# Patient Record
Sex: FEMALE | Race: BLACK OR AFRICAN AMERICAN | NOT HISPANIC OR LATINO | Employment: OTHER | URBAN - METROPOLITAN AREA
[De-identification: names, ages, dates, MRNs, and addresses within clinical notes are randomized per-mention and may not be internally consistent; named-entity substitution may affect disease eponyms.]

---

## 2017-05-22 ENCOUNTER — TELEPHONE (OUTPATIENT)
Dept: TRANSPLANT | Facility: CLINIC | Age: 62
End: 2017-05-22

## 2017-05-29 DIAGNOSIS — Z76.82 ORGAN TRANSPLANT CANDIDATE: Primary | ICD-10-CM

## 2017-06-13 ENCOUNTER — TELEPHONE (OUTPATIENT)
Dept: TRANSPLANT | Facility: CLINIC | Age: 62
End: 2017-06-13

## 2017-06-13 ENCOUNTER — OFFICE VISIT (OUTPATIENT)
Dept: TRANSPLANT | Facility: CLINIC | Age: 62
End: 2017-06-13
Payer: MEDICARE

## 2017-06-13 VITALS
HEART RATE: 89 BPM | RESPIRATION RATE: 20 BRPM | SYSTOLIC BLOOD PRESSURE: 138 MMHG | TEMPERATURE: 98 F | WEIGHT: 229.94 LBS | DIASTOLIC BLOOD PRESSURE: 77 MMHG | HEIGHT: 64 IN | OXYGEN SATURATION: 96 % | BODY MASS INDEX: 39.26 KG/M2

## 2017-06-13 DIAGNOSIS — E11.21 DIABETIC NEPHROPATHY ASSOCIATED WITH TYPE 2 DIABETES MELLITUS: ICD-10-CM

## 2017-06-13 DIAGNOSIS — N12 PYELONEPHRITIS: ICD-10-CM

## 2017-06-13 DIAGNOSIS — M15.9 PRIMARY OSTEOARTHRITIS INVOLVING MULTIPLE JOINTS: ICD-10-CM

## 2017-06-13 DIAGNOSIS — R56.9 SEIZURE: ICD-10-CM

## 2017-06-13 DIAGNOSIS — E66.01 MORBID OBESITY DUE TO EXCESS CALORIES: ICD-10-CM

## 2017-06-13 DIAGNOSIS — Z99.2 ESRD ON HEMODIALYSIS: ICD-10-CM

## 2017-06-13 DIAGNOSIS — N18.6 ESRD ON HEMODIALYSIS: ICD-10-CM
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
## 2017-06-13 PROBLEM — M15.0 PRIMARY OSTEOARTHRITIS INVOLVING MULTIPLE JOINTS: Status: ACTIVE | Noted: 2017-06-13
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
## 2017-06-13 PROBLEM — R29.6 FALLS: Status: ACTIVE | Noted: 2017-06-13

## 2017-06-13 PROBLEM — I25.810 CORONARY ARTERY DISEASE INVOLVING CORONARY BYPASS GRAFT: Status: ACTIVE | Noted: 2017-06-13

## 2017-06-13 PROCEDURE — 3044F HG A1C LEVEL LT 7.0%: CPT | Mod: TXP,,, | Performed by: INTERNAL MEDICINE

## 2017-06-13 PROCEDURE — 99999 PR PBB SHADOW E&M-EST. PATIENT-LVL III: CPT | Mod: PBBFAC,TXP,, | Performed by: INTERNAL MEDICINE

## 2017-06-13 PROCEDURE — 99205 OFFICE O/P NEW HI 60 MIN: CPT | Mod: S$PBB,TXP,, | Performed by: INTERNAL MEDICINE

## 2017-06-13 PROCEDURE — 99213 OFFICE O/P EST LOW 20 MIN: CPT | Mod: PBBFAC,25,TXP | Performed by: INTERNAL MEDICINE

## 2017-06-13 RX ORDER — AMLODIPINE BESYLATE 10 MG/1
10 TABLET ORAL DAILY
COMMUNITY

## 2017-06-13 RX ORDER — CINACALCET 90 MG/1
90 TABLET, FILM COATED ORAL
COMMUNITY

## 2017-06-13 RX ORDER — OMEPRAZOLE 20 MG/1
20 CAPSULE, DELAYED RELEASE ORAL DAILY
COMMUNITY

## 2017-06-13 RX ORDER — HYDROCODONE BITARTRATE AND ACETAMINOPHEN 7.5; 325 MG/1; MG/1
1 TABLET ORAL EVERY 8 HOURS PRN
COMMUNITY

## 2017-06-13 RX ORDER — QUETIAPINE FUMARATE 100 MG/1
100 TABLET, FILM COATED ORAL 2 TIMES DAILY
COMMUNITY

## 2017-06-13 NOTE — PROGRESS NOTES
PHARM.D. PRE-TRANSPLANT NOTE:    This patient's medication therapy was evaluated as part of her pre-transplant evaluation.    The following pharmacologic concerns were noted: Patient currently on seroquel, phenytoin, and hydrocodone/apap. Patient's last seizure around 3 years ago.      Current Outpatient Prescriptions   Medication Sig Dispense Refill    amlodipine (NORVASC) 10 MG tablet Take 10 mg by mouth once daily.      aspirin (ECOTRIN) 325 MG EC tablet Take 325 mg by mouth once daily.      atorvastatin (LIPITOR) 40 MG tablet Take 40 mg by mouth once daily.      carvedilol (COREG) 3.125 MG tablet Take 3.125 mg by mouth 2 (two) times daily with meals.       cinacalcet (SENSIPAR) 90 MG Tab Take 90 mg by mouth daily with breakfast.      hydrocodone-acetaminophen 7.5-325mg (NORCO) 7.5-325 mg per tablet Take 1 tablet by mouth every 8 (eight) hours as needed for Pain.      insulin glargine (LANTUS) 100 unit/mL injection Inject 20 Units into the skin every evening.       insulin lispro (HUMALOG) 100 unit/mL injection Inject into the skin 3 (three) times daily before meals. Sliding scale with meals      omeprazole (PRILOSEC) 20 MG capsule Take 20 mg by mouth once daily.      phenytoin (DILANTIN) 100 MG ER capsule Take 100 mg by mouth 4 (four) times daily.      promethazine (PHENERGAN) 25 MG tablet Take 25 mg by mouth every 6 (six) hours as needed for Nausea.      quetiapine (SEROQUEL) 100 MG Tab Take 100 mg by mouth 2 (two) times daily.       No current facility-administered medications for this visit.          Currently the patient is responsible for preparing / administering this patient's medications on a daily basis.  I am available for consultation and can be contacted, as needed by the other members of the Kidney Transplant team.

## 2017-06-13 NOTE — TELEPHONE ENCOUNTER
Reviewed pt transplant labs.  Notified dialysis unit dietitian of the following abnormal labs via fax.        Albumin    3.1g/dl        Cathy Vasquez MS RD LDN

## 2017-06-13 NOTE — PROGRESS NOTES
Transplant Nephrology  Kidney Transplant Recipient Evaluation    Referring Physician: Jose Lozano  Current Nephrologist: Jose Lozano    Subjective:   CC:  Initial evaluation of kidney transplant candidacy.    HPI:  Ms. Browning is a 62 y.o. year old Black or  female who has presented to be evaluated as a potential kidney transplant recipient.  She has ESRD secondary to diabetic nephropathy and HTN. She was diagnosed with DM at age 41. Patient is currently on hemodialysis started on 11/2011. Patient is dialyzing on MWF schedule.  Patient reports that she is tolerating dialysis well. She has a RUE AV fistula for dialysis access. She dialyzes for 3 hours and 30 minutes and will not have hypotension .       Patient is here with her care giver daughter. Patient has a BMI 39, CABG in 2014 with 4 grafts, high frailty index and limited functional capacity, using a cane, ambulatory dysfunction and some falls and severe DJD. No living donors at the present time.    Patient is interested with kidney transplantation. She has ESRD from long term DM, no foot ulcers, No obvious evidence of PVD, no documentation of strokes or heart failure    Patient was admitted to the hospital June 2017 for 2 days due to mental status changes associated with UTI she says    Living DONOR: No living donors    Frailty Index: HIGH    Dialysis adherence:she says never misses dilaysis    Care giver available at the time of clinic visit: dragan is the care giver. She is a transplant recipient      Previous Transplant: no    Functional Status: She does not exercise. Very sedentary and with limited functional capacity due to generalized DJD, uses a cane and unable to walk more than 50 meter because she get tired very easily      Other pertinent tests:    RADIOLOGIC EXAM: MR BRAIN WO CONTRAST    DATE AND TIME OF EXAMINATION: 1/14/2016 12:44 PM    CLINICAL HISTORY: 61yo female with ESRD with Altered Mental Status      COMPARISON: CT  1/2016    TECHNIQUE: MR BRAIN WO CONTRAST Multiplanar multi-sequence MRI of the brain was performed without the use of intravenous gadolinium contrast.    FINDINGS:     Ventricles are normal in shape but mildly prominent in size. Mild cerebral atrophy. No mass-effect or midline shift. There are scattered areas of increased T2/FLAIR signal involving the bilateral periventricular white matter most consistent with chronic microvascular ischemic changes. There are a few small foci of susceptibility artifact at the gray/white matter junction on gradient imaging likely representing remote micro hemorrhages.    No intra-axial or extra-axial fluid collections. The posterior fossa is unremarkable. Orbits are intact. There is some T2 hyperintense material within the dependent portion of the right maxillary sinus, otherwise paranasal sinuses are clear. There is a right mastoid effusion.  No nasopharyngeal masses identified. Major intracranial flow voids are normal.    On diffusion weighted images and corresponding ADC mapped images, there is no evidence of restricted diffusion to suggest acute stroke or cytotoxic edema.      1/14/2016 10:57 AM  This is a 29 Channel EEG report on patient Tia Browning   MRN [id number] on 01/14/2016 - Electrodes are arranged in the   standard international 10-20 system. Longitudinal bipolar,   transverse, and average reference montages are used in analysis.   Total duration of recording is:  23 minutes    Background consists of primarily: alpha frequency range,   symmetric, reactive - admixed with 15-30 Hz low voltage anterior   symmetric beta - there is intermittent bitemporal theta    There are no Epileptiform discharges    Sleep: none  Hyperventilation: augments theta  Photic: no effect  EKG: NSR    Interpretation:  1. There is mild non-specific diffuse cerebral dysfunction -   consider causes of diffuse encephalopathy  2. There are no epileptiform discharges          Past Medical  History:  Past Medical History:   Diagnosis Date    Arthritis     CAD (coronary artery disease)     Chronic back pain     Coronary artery disease involving coronary bypass graft, s/p CABG 2014 6/13/2017    Coronary artery disease involving coronary bypass graft, s/p CABG 2014 x4 6/13/2017    Diabetes mellitus type 2 in obese     Diabetic nephropathy associated with type 2 diabetes mellitus, she is currently insulin dependent since age 41 6/13/2017    ESRD (end stage renal disease) on dialysis     ESRD on hemodialysis since November 2011 6/13/2017    Falls: patient refers some falls because my knees give away, last time 2 days ago. she can tell exactly the frequency of the falls 6/13/2017    HTN (hypertension)     Morbid obesity     Morbid obesity 6/13/2017    Primary osteoarthritis involving multiple joints, causing chronic pain syndrome taking Norco three times a day. he under the care of a pain specilaist 6/13/2017    Seizure disorder     Seizure, last time in 2016, she cant remember or her care giver an approximate date for the last seizure. patient is on Dilantin 6/13/2017    Seizures        Past Surgical History:   Past Surgical History:   Procedure Laterality Date    AV FISTULA PLACEMENT      CORONARY ANGIOPLASTY WITH STENT PLACEMENT      Angioplasty    CORONARY ARTERY BYPASS GRAFT      Coronary Artery Bypass, 4    PARTIAL HYSTERECTOMY         Medications:   Current Outpatient Prescriptions   Medication Sig Dispense Refill    amlodipine (NORVASC) 10 MG tablet Take 10 mg by mouth once daily.      aspirin (ECOTRIN) 325 MG EC tablet Take 325 mg by mouth once daily.      atorvastatin (LIPITOR) 40 MG tablet Take 40 mg by mouth once daily.      carvedilol (COREG) 3.125 MG tablet Take 3.125 mg by mouth 2 (two) times daily with meals.       cinacalcet (SENSIPAR) 90 MG Tab Take 90 mg by mouth daily with breakfast.      hydrocodone-acetaminophen 7.5-325mg (NORCO) 7.5-325 mg per tablet Take 1  "tablet by mouth every 8 (eight) hours as needed for Pain.      insulin glargine (LANTUS) 100 unit/mL injection Inject 20 Units into the skin every evening.       insulin lispro (HUMALOG) 100 unit/mL injection Inject into the skin 3 (three) times daily before meals. Sliding scale with meals      omeprazole (PRILOSEC) 20 MG capsule Take 20 mg by mouth once daily.      phenytoin (DILANTIN) 100 MG ER capsule Take 100 mg by mouth 4 (four) times daily.      promethazine (PHENERGAN) 25 MG tablet Take 25 mg by mouth every 6 (six) hours as needed for Nausea.      quetiapine (SEROQUEL) 100 MG Tab Take 100 mg by mouth 2 (two) times daily.       No current facility-administered medications for this visit.        Social History:  Social History     Social History    Marital status:      Spouse name: N/A    Number of children: N/A    Years of education: N/A     Occupational History    Not on file.     Social History Main Topics    Smoking status: Never Smoker    Smokeless tobacco: Not on file    Alcohol use No    Drug use: No    Sexual activity: Not on file     Other Topics Concern    Not on file     Social History Narrative    No narrative on file       Family History:   Family History   Problem Relation Age of Onset    Diabetes Mother     Heart failure Mother     Hypertension Mother     Hypertension Daughter        Review of Systems   Constitutional: Positive for activity change and appetite change. Negative for fever.        Recent admission to a hospital with mental status changes and a "UTI"   HENT: Negative for hearing loss, mouth sores and sore throat.    Eyes: Negative for photophobia, pain and visual disturbance.   Respiratory: Negative for cough, chest tightness, shortness of breath and wheezing.    Cardiovascular: Negative for chest pain, palpitations and leg swelling.   Gastrointestinal: Negative for abdominal distention, abdominal pain, blood in stool, constipation, diarrhea, nausea and " "vomiting.   Genitourinary: Negative for decreased urine volume, difficulty urinating, dysuria, frequency, hematuria, menstrual problem and urgency.   Musculoskeletal: Positive for arthralgias, back pain, gait problem, joint swelling and myalgias.   Skin: Negative for pallor, rash and wound.   Neurological: Positive for seizures. Negative for dizziness, tremors, syncope, weakness, light-headedness and headaches.   Hematological: Negative for adenopathy. Does not bruise/bleed easily.   Psychiatric/Behavioral: Negative for confusion, dysphoric mood and sleep disturbance. The patient is not nervous/anxious.      Constitutional: Denies fever/chills, fatigue, night sweats  EENT: Denies vision problems, hearing problems, trouble swallowing.   Respiratory: Denies shortness of breath, dyspnea on exertion, orthopnea, wheezing, hemoptysis, denies known TB exposure or history of positive TB skin test  Cardiovascular: Denies chest pain, palpitations, history of MI, history of stroke, history of DVT  Gastrointestinal: Denies abdominal pain, nausea/vomiting/diarrhea/constipation. Denies history of GI bleeding or ulcers.   Genitourinary: Denies history of kidney stones, recurrent UTI's, history of urinary obstruction, hematuria, dysuria, urinary frequency, incontinence  Musculoskeletal: Denies trouble moving extremities, denies joint pain or swelling  Skin: Denies history of skin cancer, denies rash, ulcerations  Heme/onc: Denies any history of cancer, denies history of coagulopathies or bleeding disorders  Endocrine: Denies thyroid disease, unintentional weight loss/weight gain  Neurological: Denies tremors, seizures, dizziness, headaches, peripheral neuropathy  Psychiatric: Denies anxiety, depression. Denies hallucinations or delusions.      Objective:   Blood pressure 138/77, pulse 89, temperature 97.8 °F (36.6 °C), temperature source Oral, resp. rate 20, height 5' 4.37" (1.635 m), weight 104.3 kg (229 lb 15 oz), SpO2 96 %.body " "mass index is 39.02 kg/m².    Physical Exam   Constitutional: She is oriented to person, place, and time. She appears well-developed and well-nourished. No distress.   /77 (BP Location: Right arm, Patient Position: Sitting, BP Method: Automatic)   Pulse 89   Temp 97.8 °F (36.6 °C) (Oral)   Resp 20   Ht 5' 4.37" (1.635 m)   Wt 104.3 kg (229 lb 15 oz)   SpO2 96%   BMI 39.02 kg/m²     Patient with a cane reaching the examination table with some difficulty    02 was 94% after walking for 3 to 4 minutes     HENT:   Head: Normocephalic and atraumatic.   Right Ear: External ear normal.   Left Ear: External ear normal.   Nose: Nose normal.   Mouth/Throat: Oropharynx is clear and moist. No oropharyngeal exudate.   Eyes: Conjunctivae and EOM are normal. Pupils are equal, round, and reactive to light.   Neck: Normal range of motion. Neck supple. No JVD present. No thyromegaly present.   Cardiovascular: Normal rate, regular rhythm and intact distal pulses.  Exam reveals no gallop and no friction rub.    Murmur heard.  LUE AVF   Pulmonary/Chest: Effort normal and breath sounds normal. No respiratory distress. She has no wheezes. She has no rales.   Abdominal: Soft. Bowel sounds are normal. She exhibits no distension and no mass. There is no tenderness. There is no rebound and no guarding.   Musculoskeletal: Normal range of motion. She exhibits no edema or tenderness.   Neurological: She is alert and oriented to person, place, and time. She has normal reflexes. She displays normal reflexes. No cranial nerve deficit. She exhibits normal muscle tone. Coordination normal.   Skin: Skin is warm and dry. No rash noted. No pallor.   Psychiatric: She has a normal mood and affect. Her behavior is normal. Judgment and thought content normal.   Nursing note and vitals reviewed.    General: No acute distress, well groomed, alert and oriented x 3  HEENT: Normocephalic, atraumatic, PERRLA, sclera anicteric, conjunctiva/corneas " clear, EOM's intact bilaterally, external inspection of ears and nose normal, moist mucous membranes, no oral ulcerations/lesions   Neck: Supple, symmetrical, trachea midline, no masses, no carotid bruits, no JVD, thyroid is normal without nodules or enlargement   Respiratory: Clear to auscultation bilaterally, respirations unlabored, no rales/rhonchi/wheezing   Cardiovacular: Regular rate and rhythm, S1, S2 normal, no murmurs, no rubs or gallops   Gastrointestinal: Soft, non-tender, bowel sounds normal, no masses, no palpable organomegaly  Extremities: No clubbing or cyanosis of upper extremities bilaterally, no pedal edema bilaterally; +2 bilateral femoral, posterior tibial, and dorsalis pedis pulses  Skin: warm and dry; no rash on exposed skin  Lymph nodes: Cervical and supraclavicular nodes normal   Neurologic: No focal neurologic deficits.   Musculoskeletal: moves all extremities without difficulty, FROM, 5/5 strength, ambulates without an assistive device  Psychiatric: Normal mood and affect. Responds appropriately to questions.      Labs:  Lab Results   Component Value Date    WBC 5.89 06/13/2017    HGB 10.5 (L) 06/13/2017    HCT 32.7 (L) 06/13/2017     06/13/2017    K 4.1 06/13/2017    CL 95 06/13/2017    CO2 33 (H) 06/13/2017    BUN 35 (H) 06/13/2017    CREATININE 5.3 (H) 06/13/2017    EGFRNONAA 8.1 (A) 06/13/2017    CALCIUM 8.9 06/13/2017    PHOS 5.2 (H) 06/13/2017    MG 1.6 01/24/2015    ALBUMIN 3.1 (L) 06/13/2017    AST 11 06/13/2017    ALT 7 (L) 06/13/2017    .0 (H) 06/13/2017       Lab Results   Component Value Date    BILIRUBINUA 1+ (A) 01/24/2015    PROTEINUA 2+ (A) 01/24/2015    NITRITE Negative 01/24/2015    RBCUA 10 (H) 01/24/2015    WBCUA 3 01/24/2015       No results found for: HLAABCTYPE    Labs were reviewed with the patient.    Assessment:     1. ESRD on hemodialysis since November 2011    2. Diabetic nephropathy associated with type 2 diabetes mellitus, she is currently insulin  dependent since age 41    3. Morbid obesity due to excess calories    4. Falls, subsequent encounter    5. Primary osteoarthritis involving multiple joints, causing chronic pain syndrome taking Norco three times a day. he under the care of a pain specilaist    6. Seizure, last time in 2016, she cant remember or her care giver an approximate date for the last seizure. patient is on Dilantin        Plan:     Transplant Candidacy:   After obtaining history and performing physical exam as well as reviewing available diagnostic studies, Ms. Browning is an unacceptable kidney transplant candidate.  Final determination of transplant candidacy will be made once workup is complete and reviewed by the selection committee.    Patient at this point is not a transplant candidate    Will discuss the case with the transplant team for a final decision    I am concerned with her debility associated with generalized DJD, using a cane and difficulty walking    BMI of 39 with significant abdominal obesity    H/o heart disease s/p revascularization in 2014 (CABG x 4 grafts)    In addition MRI shows some microvascular changes with remote micro hemorrhages    We spoke for almost 1 hr. They were disappointed due that the possibility that hse will be deemed Nota suitable transplant candidate. I explained to the patient and daughter what in my opinion are the barriers for kidney transplantation    Education provided    All questions answered          Michele Vázquez MD       [facility] Patient Status  Functional Status: 40% - Disabled: requires special care and assistance  Physical Capacity: Limited Mobility

## 2017-06-13 NOTE — LETTER
Edel 15, 2017        Jose Lozano  [address] [place] [address]  Phone: [phone]  Fax: [phone]             [facility]  [address]  [facility] [address]  Phone: [phone]   Patient: Tia Browning   MR Number: [id number]   YOB: 1955   Date of Visit: 6/13/2017       Dear Dr. Jose Lozano    Thank you for referring Tia Browning to me for evaluation. Attached you will find relevant portions of my assessment and plan of care.    If you have questions, please do not hesitate to call me. I look forward to following Tia Browning along with you.    Sincerely,    Michele Vázquez MD    Enclosure    If you would like to receive this communication electronically, please contact [email or web address] or [phone] to request [employer] access.    [employer] is a tool which provides read-only access to select patient information with whom you have a relationship. Its easy to use and provides real time access to review your patients record including encounter summaries, notes, results, and demographic information.    If you feel you have received this communication in error or would no longer like to receive these types of communications, please e-mail [email or web address]

## 2017-06-13 NOTE — PROGRESS NOTES
Pre Transplant Infectious Diseases Consult  Kidney Transplant Recipient Evaluation    Requesting Physician: Dr. Pan     Reason for Visit:  No chief complaint on file.    History of Present Illness  Tia Browning is a 62 y.o. year old Black or  female with advanced {AMB ID TRANSPLANTS:21314} disease currently being evaluated for {AMB ID TRANSPLANTS:21314} transplant.  Patient denies any recent fever, chills, or infective illnesses.      1) Do you have a history of:   YES NO   Diabetes      [] []     Diabetic Foot Infection/Bone Infection  []        []     Surgical Removal of Spleen   []  []                  2) Have you had recurrent infections involving:             YES NO  Sinus infections  []         []   Sore Throat   []         []                 Prostate Infections  []         []              Bladder Infections  []         []                     Kidney Infections  []         []                               Intestinal Infections  []         []      Skin Infections   []         []       Reproductive Infections          []  []   Periodontal Disease  []         []        3)Have you ever had: YES     NO UNKNOWN      Chicken Pox   []         []  []   Shingles   []         []  []   Orolabial Herpes             []  []  []   Genital Herpes  []         []  []   Cytomegalovirus  []         []  []   Segundo-Barr Virus  []         []  []   Genital Warts   []         []  []   Hepatitis A   []         []  []   Hepatitis B   []         []  []   Hepatitis C   []         []  []   Syphilis   []         []  []   Gonorrhea   []         []  []   Pelvic Inflammatory   Disease   []         []  []   Chlamydia Infection  []         []  []   Intestinal parasites   or worms   []         []  []   Fungal Infections  []         []  []   Blood Infections  []         []  []       Comment:       4) Have you ever been exposed   YES NO  To someone with tuberculosis?  []   []   If yes, what treatment did you receive:     5)  What states have you lived in?     6) What countries have you visited for more than 2 weeks?                         YES NO  7) Did you have any associated infections?  []  []       8) Are you planning to travel outside the    []  []   [place] after your transplant?    9) Household                   YES NO  Do you have pets living in your house?    []         []   If yes, describe:     Do you spend time or live on a farm or     []         []   have livestock or other farm animals?  If yes, which ones:    Do you have a fish tank?          []  []       Do you have a litter box?      []         []     Do you fish or hunt?      []         []     Do you clean or skin fish or animals?    []         []     Do you consume raw or undercooked    []         []   meat, fish, or shellfish?      10) What occupations have you had?    11) Patient reports hobby to be {[facility] HOBBIES:07991}.          12)Do you garden or otherwise  YES NO   work in the soil?    []         []   13)Do you hike, camp, or spend     time in wooded areas?   []         []        14) The patient's immunization history was reviewed.    Have you ever received:  YES NO UNKNOWN DATES   Routine Childhood vaccines  []         []  []      Influenza vaccine   []  []  []    Pneumovax    []  []  []     Tetanus-diptheria   []         []  []    Hepatitis A vaccine series       []  []  []    Hepatitis B vaccine series         []  []  []    Meningitis vaccine   []         []  []    Varicella vaccine   []         []  []        Based on the patients immunization history and serologies, immunizations were ordered:         Ordered  Not Ordered  Influenza Vaccine     []    []   Hepatitis A series at 0,  6 months   []    []   Hepatitis B seriesat 0, 1, and 6 months  []    []   Hepatitis B High Dose 0,1, and 6 months  []    []   Prevnar      []    []   Pneumovax      []    []    TDap       []    []    Zoster       []    []   Menactra      []    []            The patient was  encouraged to contact us about any problems that may develop after immunization and possible side effects were reviewed.      Previous Transplant: {yes/no:57080}    Etiology of {AMB ID TRANSPLANTS:22188} Disease: {etiology:38646}    Allergies: Sulfa (sulfonamide antibiotics)    There is no immunization history on file for this patient.  Past Medical History:   Diagnosis Date    Arthritis     CAD (coronary artery disease)     Chronic back pain     Diabetes mellitus type 2 in obese     ESRD (end stage renal disease) on dialysis     HTN (hypertension)     Morbid obesity     Seizure disorder     Seizures      Past Surgical History:   Procedure Laterality Date    AV FISTULA PLACEMENT      CORONARY ANGIOPLASTY WITH STENT PLACEMENT      Angioplasty    CORONARY ARTERY BYPASS GRAFT      Coronary Artery Bypass, 4    PARTIAL HYSTERECTOMY        Social History     Social History    Marital status:      Spouse name: N/A    Number of children: N/A    Years of education: N/A     Occupational History    Not on file.     Social History Main Topics    Smoking status: Never Smoker    Smokeless tobacco: Not on file    Alcohol use No    Drug use: No    Sexual activity: Not on file     Other Topics Concern    Not on file     Social History Narrative    No narrative on file       Review of Systems   Constitution: Negative for chills, decreased appetite, fever, weakness, malaise/fatigue, night sweats, weight gain and weight loss.   HENT: Negative for congestion, ear pain, headaches, hearing loss, hoarse voice, sore throat and tinnitus.    Eyes: Negative for blurred vision, redness and visual disturbance.   Cardiovascular: Negative for chest pain, leg swelling and palpitations.   Respiratory: Negative for cough, hemoptysis, shortness of breath, sputum production and wheezing.    Endocrine: Negative for cold intolerance and heat intolerance.   Hematologic/Lymphatic: Negative for adenopathy. Does not  bruise/bleed easily.   Skin: Negative for dry skin, itching, rash and suspicious lesions.   Musculoskeletal: Negative for back pain, joint pain, myalgias and neck pain.   Gastrointestinal: Negative for abdominal pain, constipation, diarrhea, heartburn, nausea and vomiting.   Genitourinary: Negative for dysuria, flank pain, frequency, hematuria, hesitancy and urgency.   Neurological: Negative for dizziness, numbness and paresthesias.   Psychiatric/Behavioral: Negative for depression and memory loss. The patient does not have insomnia and is not nervous/anxious.    Allergic/Immunologic: Negative for environmental allergies, HIV exposure, hives and persistent infections.     Physical Exam   Constitutional: She is oriented to person, place, and time. She appears well-developed and well-nourished. No distress.   HENT:   Head: Normocephalic and atraumatic.   Mouth/Throat: Uvula is midline, oropharynx is clear and moist and mucous membranes are normal. No oral lesions.   Eyes: EOM are normal. Pupils are equal, round, and reactive to light. No scleral icterus.   Cardiovascular: Normal rate, regular rhythm and normal heart sounds.  Exam reveals no gallop and no friction rub.    No murmur heard.  Pulmonary/Chest: Effort normal and breath sounds normal. No respiratory distress. She has no wheezes. She has no rales.   Abdominal: Soft. Bowel sounds are normal. She exhibits no distension and no mass. There is no hepatosplenomegaly. There is no tenderness. There is no rebound and no guarding.   Musculoskeletal: She exhibits no edema.   Neurological: She is alert and oriented to person, place, and time.   Skin: Skin is warm, dry and intact. No rash noted.   Psychiatric: She has a normal mood and affect. Her behavior is normal.     Diagnostics: No results found for: RPR  No results found for: CMVANTIBODIE  No results found for: HIV1X2  No results found for: HTLVIIIANTIB  Hepatitis B Surface Ag   Date Value Ref Range Status    01/25/2015 Negative  Final     No results found for: HEPBCAB  No results found for: HEPCAB  No results found for: TOXOIGG  No components found for: TOXOIGGINTER  No results found for: OHJ6YYW  No results found for: CRG9YDM  No results found for: VARICELLAZOS  No results found for: VARICELLAINT  No results found for: STRONGANTIGG  No results found for: EPSTEINBARRV  No results found for: HEPBSAB  No results found for: QUANTIFERON  No results found for: HEPAIGM  No results found for: PPD  No results found for this or any previous visit.         Transplant Infectious Diseases - Candidacy    Assessment/Plan:     Transplant Candidacy: Based on available information, there are no identified significant barriers to transplantation from an infectious disease standpoint pending acceptable serologies.  Final determination of transplant candidacy will be made once evaluation is complete and reviewed by the Transplant Selection Committee.    Silvina Carbajal APRN, ANP         Counseling:I discussed with Tia the risk for increased susceptibility to infections following transplantation including increased risk for infection right after transplant and if rejection should occur.  The patients has been counseled on the importance of vaccinations including but not limited to a yearly flu vaccine.  Specific guidance has been provided to the patient regarding the patients occupation, hobbies and activities to avoid future infectious complications including but not limited to avoiding undercooked meats and seafood, proper hygiene, and contact with animals.

## 2017-06-14 ENCOUNTER — TELEPHONE (OUTPATIENT)
Dept: TRANSPLANT | Facility: CLINIC | Age: 62
End: 2017-06-14

## 2017-06-14 NOTE — TELEPHONE ENCOUNTER
Nutritional assessment from dialysis unit received and reviewed--no nutritional changes to plan needed at this time.  Pt to continue to follow-up with renal dietitians recommendations.      Cathy Vasquez MS RD LDN

## 2017-06-16 ENCOUNTER — COMMITTEE REVIEW (OUTPATIENT)
Dept: TRANSPLANT | Facility: CLINIC | Age: 62
End: 2017-06-16

## 2017-06-16 NOTE — LETTER
June 16, 2017    Tia Browning  [address]  [place] [address]        Dear Tia Browning:  MRN: [id number]    It is the duty of the [facility] Selection Committee to determine which patients are candidates for a transplant. For this reason, our committee has the difficult task of evaluating patients to determine which ones have the greatest chance of having a successful transplant. We are aware of the magnitude of this responsibility, and we approach it with reverence and humility.    It is with regret I inform you that you are not approved as a transplant candidate due to significant decline in  functional status, elevated BMI, and coronary artery disease. You may be re referred post physical therapy with increased functional status, and weight loss. Based on this review, we have determined that at this time, you are not a candidate for a transplant at [facility].      The selection committee carefully considers each patient's transplant candidacy and determines whether it is safe to proceed with transplantation on a case-by-case basis using established selection criteria.  At present, the risk of proceeding with an elective transplant surgery has become too high.                                                                               Although the selection committee believes you are not a suitable transplant candidate, you have the option to be evaluated at other transplant centers who may have different selection criteria.  You may request your [facility] records be sent to any center of your choice by contacting our Medical Records Department at [phone].                                                                               Attached is a letter from the [employer] Organ [employer]aring (UNOS).  It describes the services and information offered to patients by UNOS and the Organ Procurement and Transplant Network.    The [facility] Selection [facility] sincerely wishes you the best and  remains available to answer any questions.  Please do not hesitate to contact our pre-transplant office if we can assist you in any other way.                                                                               Sincerely,      Trisha Wiseman MD  Medical Director, Kidney & Kidney/Pancreas Transplantation      Encl: UNOS Letter          OPTN/UNOS: Your Resource for Organ Transplant Information        If you have a question regarding your own medical care, you always should call your transplant center first. However, for general organ transplant-related information, you can call the [employer] (UNOS) toll-free patient services line at 1-597.203.9677.    Anyone, including potential transplant candidates, recipients, family members/friends, living donors, and/or donor family members can call this number to:    · talk about organ donation, living donation, how transplant and donation work, the donation process, transplant policies, and transplant/donor information;  · get a free patient information kit with helpful booklets, waiting list and transplant information, and a list of all transplant centers;  · ask questions about the Organ Procurement and Transplantation Network (OPTN) web site (www.optn.transplant.hrsa.gov); the UNOS Web site (www.unos.org); or the UNOS web site for living donors and transplant recipients (www.transplantliving.org);  · learn how UNOS and the OPTN can help you;  · talk about any concerns that you may have with a transplant center and how they perform    UNOS is a not-for-profit organization that provides all of the administrative services for the national OPTN under federal contract to the Health Resources and Services Administration (HRSA), an agency under the U.S. Department of Health and Human Services (HHS).     UNOS and OPTN responsibilities include:    · writing educational material for patients, the public and professionals;  · helping to make  people aware of the need for donated organs and tissue;  · writing organ transplant policy with help from doctors, nurses, transplant patients/candidates, donor families, living donors, and the public;  · coordinating the organ matching and placement process;  · collecting information about every organ transplant and donation that occurs in the [place].    Remember, you should contact your transplant center directly if you have questions or concerns about your own medical care including medical records, work-up progress and test reports. [facility] is not your transplant center, and staff at [facility] will not be able to transfer you to your transplant center, so keep your transplant centers phone number handy. But, while you research your transplant needs and learn as much as you can about transplantation and donation, we welcome your call to our toll-free patient services line at [phone].

## 2017-06-16 NOTE — COMMITTEE REVIEW
Native Organ Dx: Diabetes Mellitus - Type II      Not approved for LRD/CAD transplant due to significant decline functional status, elevated BMI, coronary artery disease. May be re-referred if functional capacity increases and weight loss.      Note written by: Muriel Lawrence RN    ===============================================

## 2017-09-11 ENCOUNTER — TELEPHONE (OUTPATIENT)
Dept: TRANSPLANT | Facility: CLINIC | Age: 62
End: 2017-09-11

## 2017-09-11 NOTE — TELEPHONE ENCOUNTER
Nurse spoke with patient's daughter Sruthi and informed her that the patient was denied foe kidney transplant on 6/16/17. She states she came with her mother to her doctors appointment. She was informed that per the doctors note he explained to the patient and the patient's daughter why she was not a candidate for kidney transplant. Patient's daughter confirmed mother's [address] and dialysis unit. A copy of the denial letter was mailed. Patient's daughter verbalized understanding of the above information.

## 2017-09-11 NOTE — TELEPHONE ENCOUNTER
----- Message from Yaima Pendleton sent at 9/11/2017  1:06 PM CDT -----  Contact: patient daughter   Calling to speak with a nurse to get an update on the patient txp. Please call